# Patient Record
Sex: FEMALE | Race: WHITE | Employment: OTHER | URBAN - METROPOLITAN AREA
[De-identification: names, ages, dates, MRNs, and addresses within clinical notes are randomized per-mention and may not be internally consistent; named-entity substitution may affect disease eponyms.]

---

## 2022-11-02 ENCOUNTER — ANESTHESIA EVENT (OUTPATIENT)
Dept: SURGERY | Age: 62
End: 2022-11-02
Payer: COMMERCIAL

## 2022-11-03 ENCOUNTER — ANESTHESIA (OUTPATIENT)
Dept: SURGERY | Age: 62
End: 2022-11-03
Payer: COMMERCIAL

## 2022-11-03 ENCOUNTER — HOSPITAL ENCOUNTER (OUTPATIENT)
Age: 62
Setting detail: OUTPATIENT SURGERY
Discharge: HOME | End: 2022-11-03
Attending: PODIATRIST | Admitting: PODIATRIST
Payer: COMMERCIAL

## 2022-11-03 VITALS
BODY MASS INDEX: 42.16 KG/M2 | RESPIRATION RATE: 15 BRPM | DIASTOLIC BLOOD PRESSURE: 68 MMHG | HEART RATE: 64 BPM | SYSTOLIC BLOOD PRESSURE: 149 MMHG | WEIGHT: 223.33 LBS | HEIGHT: 61 IN | TEMPERATURE: 98 F | OXYGEN SATURATION: 100 %

## 2022-11-03 PROBLEM — M20.41 HAMMERTOE OF SECOND TOE OF RIGHT FOOT: Status: ACTIVE | Noted: 2022-11-03

## 2022-11-03 PROBLEM — M77.41 METATARSALGIA OF RIGHT FOOT: Status: ACTIVE | Noted: 2022-11-03

## 2022-11-03 PROCEDURE — 77030040361 HC SLV COMPR DVT MDII -B

## 2022-11-03 PROCEDURE — 74011000250 HC RX REV CODE- 250: Performed by: ANESTHESIOLOGY

## 2022-11-03 PROCEDURE — 74011250636 HC RX REV CODE- 250/636: Performed by: PODIATRIST

## 2022-11-03 PROCEDURE — 76060000062 HC AMB SURG ANES 1 TO 1.5 HR: Performed by: PODIATRIST

## 2022-11-03 PROCEDURE — 74011250636 HC RX REV CODE- 250/636: Performed by: ANESTHESIOLOGY

## 2022-11-03 PROCEDURE — 77030036687 HC SHOE PSTOP S2SG -A

## 2022-11-03 PROCEDURE — 2709999900 HC NON-CHARGEABLE SUPPLY: Performed by: PODIATRIST

## 2022-11-03 PROCEDURE — 76210000050 HC AMBSU PH II REC 0.5 TO 1 HR: Performed by: PODIATRIST

## 2022-11-03 PROCEDURE — 74011000250 HC RX REV CODE- 250: Performed by: PODIATRIST

## 2022-11-03 PROCEDURE — 77030000032 HC CUF TRNQT ZIMM -B: Performed by: PODIATRIST

## 2022-11-03 PROCEDURE — 76030000001 HC AMB SURG OR TIME 1 TO 1.5: Performed by: PODIATRIST

## 2022-11-03 PROCEDURE — 77030002986 HC SUT PROL J&J -A: Performed by: PODIATRIST

## 2022-11-03 PROCEDURE — 77030020275 HC MISC ORTHOPEDIC: Performed by: PODIATRIST

## 2022-11-03 PROCEDURE — 77030040922 HC BLNKT HYPOTHRM STRY -A

## 2022-11-03 PROCEDURE — 77030002933 HC SUT MCRYL J&J -A: Performed by: PODIATRIST

## 2022-11-03 PROCEDURE — C1713 ANCHOR/SCREW BN/BN,TIS/BN: HCPCS | Performed by: PODIATRIST

## 2022-11-03 DEVICE — IMPLANTABLE DEVICE: Type: IMPLANTABLE DEVICE | Site: FOOT | Status: FUNCTIONAL

## 2022-11-03 RX ORDER — PROPOFOL 10 MG/ML
INJECTION, EMULSION INTRAVENOUS AS NEEDED
Status: DISCONTINUED | OUTPATIENT
Start: 2022-11-03 | End: 2022-11-03 | Stop reason: HOSPADM

## 2022-11-03 RX ORDER — LIDOCAINE HYDROCHLORIDE 20 MG/ML
INJECTION, SOLUTION INFILTRATION; PERINEURAL AS NEEDED
Status: DISCONTINUED | OUTPATIENT
Start: 2022-11-03 | End: 2022-11-03 | Stop reason: HOSPADM

## 2022-11-03 RX ORDER — LIDOCAINE HYDROCHLORIDE 10 MG/ML
0.1 INJECTION, SOLUTION EPIDURAL; INFILTRATION; INTRACAUDAL; PERINEURAL AS NEEDED
Status: DISCONTINUED | OUTPATIENT
Start: 2022-11-03 | End: 2022-11-03 | Stop reason: HOSPADM

## 2022-11-03 RX ORDER — BUPIVACAINE HYDROCHLORIDE 5 MG/ML
INJECTION, SOLUTION EPIDURAL; INTRACAUDAL AS NEEDED
Status: DISCONTINUED | OUTPATIENT
Start: 2022-11-03 | End: 2022-11-03 | Stop reason: HOSPADM

## 2022-11-03 RX ORDER — SODIUM CHLORIDE 0.9 % (FLUSH) 0.9 %
5-40 SYRINGE (ML) INJECTION EVERY 8 HOURS
Status: DISCONTINUED | OUTPATIENT
Start: 2022-11-03 | End: 2022-11-03 | Stop reason: HOSPADM

## 2022-11-03 RX ORDER — SODIUM CHLORIDE 0.9 % (FLUSH) 0.9 %
5-40 SYRINGE (ML) INJECTION AS NEEDED
Status: DISCONTINUED | OUTPATIENT
Start: 2022-11-03 | End: 2022-11-03 | Stop reason: HOSPADM

## 2022-11-03 RX ORDER — MIDAZOLAM HYDROCHLORIDE 1 MG/ML
INJECTION, SOLUTION INTRAMUSCULAR; INTRAVENOUS AS NEEDED
Status: DISCONTINUED | OUTPATIENT
Start: 2022-11-03 | End: 2022-11-03 | Stop reason: HOSPADM

## 2022-11-03 RX ORDER — DIPHENHYDRAMINE HYDROCHLORIDE 50 MG/ML
12.5 INJECTION, SOLUTION INTRAMUSCULAR; INTRAVENOUS AS NEEDED
Status: DISCONTINUED | OUTPATIENT
Start: 2022-11-03 | End: 2022-11-03 | Stop reason: HOSPADM

## 2022-11-03 RX ORDER — CELECOXIB 200 MG/1
CAPSULE ORAL DAILY
COMMUNITY

## 2022-11-03 RX ORDER — LIDOCAINE HYDROCHLORIDE 10 MG/ML
INJECTION INFILTRATION; PERINEURAL AS NEEDED
Status: DISCONTINUED | OUTPATIENT
Start: 2022-11-03 | End: 2022-11-03 | Stop reason: HOSPADM

## 2022-11-03 RX ORDER — PROPOFOL 10 MG/ML
INJECTION, EMULSION INTRAVENOUS
Status: DISCONTINUED | OUTPATIENT
Start: 2022-11-03 | End: 2022-11-03 | Stop reason: HOSPADM

## 2022-11-03 RX ORDER — ALBUTEROL SULFATE 0.83 MG/ML
2.5 SOLUTION RESPIRATORY (INHALATION) AS NEEDED
Status: DISCONTINUED | OUTPATIENT
Start: 2022-11-03 | End: 2022-11-03 | Stop reason: HOSPADM

## 2022-11-03 RX ORDER — FLUMAZENIL 0.1 MG/ML
0.2 INJECTION INTRAVENOUS
Status: DISCONTINUED | OUTPATIENT
Start: 2022-11-03 | End: 2022-11-03 | Stop reason: HOSPADM

## 2022-11-03 RX ORDER — NALOXONE HYDROCHLORIDE 0.4 MG/ML
0.04 INJECTION, SOLUTION INTRAMUSCULAR; INTRAVENOUS; SUBCUTANEOUS
Status: DISCONTINUED | OUTPATIENT
Start: 2022-11-03 | End: 2022-11-03 | Stop reason: HOSPADM

## 2022-11-03 RX ORDER — SODIUM CHLORIDE, SODIUM LACTATE, POTASSIUM CHLORIDE, CALCIUM CHLORIDE 600; 310; 30; 20 MG/100ML; MG/100ML; MG/100ML; MG/100ML
125 INJECTION, SOLUTION INTRAVENOUS CONTINUOUS
Status: DISCONTINUED | OUTPATIENT
Start: 2022-11-03 | End: 2022-11-03 | Stop reason: HOSPADM

## 2022-11-03 RX ORDER — HYDROMORPHONE HYDROCHLORIDE 1 MG/ML
.25-1 INJECTION, SOLUTION INTRAMUSCULAR; INTRAVENOUS; SUBCUTANEOUS
Status: DISCONTINUED | OUTPATIENT
Start: 2022-11-03 | End: 2022-11-03 | Stop reason: HOSPADM

## 2022-11-03 RX ORDER — ONDANSETRON 2 MG/ML
4 INJECTION INTRAMUSCULAR; INTRAVENOUS AS NEEDED
Status: DISCONTINUED | OUTPATIENT
Start: 2022-11-03 | End: 2022-11-03 | Stop reason: HOSPADM

## 2022-11-03 RX ORDER — HYDROCHLOROTHIAZIDE 25 MG/1
12.5 TABLET ORAL DAILY
COMMUNITY

## 2022-11-03 RX ORDER — FENTANYL CITRATE 50 UG/ML
INJECTION, SOLUTION INTRAMUSCULAR; INTRAVENOUS AS NEEDED
Status: DISCONTINUED | OUTPATIENT
Start: 2022-11-03 | End: 2022-11-03 | Stop reason: HOSPADM

## 2022-11-03 RX ORDER — ATORVASTATIN CALCIUM 20 MG/1
TABLET, FILM COATED ORAL DAILY
COMMUNITY

## 2022-11-03 RX ORDER — FENTANYL CITRATE 50 UG/ML
25 INJECTION, SOLUTION INTRAMUSCULAR; INTRAVENOUS
Status: DISCONTINUED | OUTPATIENT
Start: 2022-11-03 | End: 2022-11-03 | Stop reason: HOSPADM

## 2022-11-03 RX ADMIN — CEFAZOLIN SODIUM 2 G: 1 POWDER, FOR SOLUTION INTRAMUSCULAR; INTRAVENOUS at 07:34

## 2022-11-03 RX ADMIN — MIDAZOLAM HYDROCHLORIDE 2 MG: 1 INJECTION, SOLUTION INTRAMUSCULAR; INTRAVENOUS at 07:24

## 2022-11-03 RX ADMIN — FENTANYL CITRATE 50 MCG: 50 INJECTION, SOLUTION INTRAMUSCULAR; INTRAVENOUS at 07:45

## 2022-11-03 RX ADMIN — PROPOFOL 50 MCG/KG/MIN: 10 INJECTION, EMULSION INTRAVENOUS at 07:31

## 2022-11-03 RX ADMIN — PROPOFOL 25 MG: 10 INJECTION, EMULSION INTRAVENOUS at 07:31

## 2022-11-03 RX ADMIN — PROPOFOL 25 MG: 10 INJECTION, EMULSION INTRAVENOUS at 07:33

## 2022-11-03 RX ADMIN — LIDOCAINE HYDROCHLORIDE 30 MG: 20 INJECTION, SOLUTION INFILTRATION; PERINEURAL at 07:30

## 2022-11-03 RX ADMIN — MIDAZOLAM HYDROCHLORIDE 3 MG: 1 INJECTION, SOLUTION INTRAMUSCULAR; INTRAVENOUS at 07:21

## 2022-11-03 RX ADMIN — SODIUM CHLORIDE, POTASSIUM CHLORIDE, SODIUM LACTATE AND CALCIUM CHLORIDE 125 ML/HR: 600; 310; 30; 20 INJECTION, SOLUTION INTRAVENOUS at 06:55

## 2022-11-03 RX ADMIN — FENTANYL CITRATE 50 MCG: 50 INJECTION, SOLUTION INTRAMUSCULAR; INTRAVENOUS at 08:07

## 2022-11-03 NOTE — BRIEF OP NOTE
Brief Postoperative Note    Patient: Wade Sotelo  YOB: 1960  MRN: [id number]    Date of Procedure: 11/3/2022     Pre-Op Diagnosis: Metatarsalgia, right foot [M77.41]  Hammer toe of right foot [M20.41]    Post-Op Diagnosis: Same as preoperative diagnosis. Procedure(s):  RIGHT WEIL SECOND METATARSAL OSTEOTOMY WITH SCREW FIXATION AND RIGHT SECOND HAMMER TOE CORRECTION OSTEOTOMY    Surgeon(s):  Sharron Santoyo DPM    Surgical Assistant: Surg Asst-1: Idris Delaney    Anesthesia: MAC     Estimated Blood Loss (mL): Minimal    Complications: None    Specimens: * No specimens in log *     Implants:   Implant Name Type Inv. Item Serial No.  Lot No. LRB No. Used Action   SCR QUICKFIX MAG 2X11MM --  - SN/A  SCR QUICKFIX MAG 2X11MM --  N/A [employer] N/A Right 1 Implanted       Drains: * No LDAs found *    Findings: Elongated bone, contracted soft tissues.      Electronically Signed by Lazarus Barreto DPM on 11/3/2022 at 8:48 AM

## 2022-11-03 NOTE — ANESTHESIA PREPROCEDURE EVALUATION
Relevant Problems   No relevant active problems       Anesthetic History     PONV          Review of Systems / Medical History  Patient summary reviewed and nursing notes reviewed    Pulmonary            Asthma : well controlled       Neuro/Psych   Within defined limits           Cardiovascular    Hypertension: well controlled          Hyperlipidemia    Exercise tolerance: >4 METS     GI/Hepatic/Renal     GERD: well controlled      Hiatal hernia and PUD     Endo/Other        Morbid obesity and arthritis     Other Findings              Physical Exam    Airway  Mallampati: IV    Neck ROM: normal range of motion   Mouth opening: Diminished (comment)     Cardiovascular    Rhythm: regular  Rate: normal         Dental         Pulmonary  Breath sounds clear to auscultation               Abdominal         Other Findings            Anesthetic Plan    ASA: 3  Anesthesia type: MAC            Anesthetic plan and risks discussed with: Patient      Informed consent obtained.

## 2022-11-03 NOTE — ANESTHESIA POSTPROCEDURE EVALUATION
Procedure(s):  RIGHT WEIL SECOND METATARSAL OSTEOTOMY WITH SCREW FIXATION AND RIGHT SECOND HAMMER TOE CORRECTION OSTEOTOMY. MAC    Anesthesia Post Evaluation      Multimodal analgesia: multimodal analgesia not used between 6 hours prior to anesthesia start to PACU discharge  Patient location during evaluation: PACU  Patient participation: complete - patient participated  Level of consciousness: awake and alert  Pain score: 0  Pain management: adequate  Airway patency: patent  Anesthetic complications: no  Cardiovascular status: hemodynamically stable and acceptable  Respiratory status: acceptable  Hydration status: acceptable  Comments: Patient seen and evaluated; no concerns. Post anesthesia nausea and vomiting:  none  Final Post Anesthesia Temperature Assessment:  Normothermia (36.0-37.5 degrees C)      INITIAL Post-op Vital signs:   Vitals Value Taken Time   /68 11/03/22 0850   Temp 36.7 °C (98 °F) 11/03/22 0842   Pulse 73 11/03/22 0854   Resp 17 11/03/22 0854   SpO2 100 % 11/03/22 0854   Vitals shown include unvalidated device data.

## 2022-11-03 NOTE — DISCHARGE INSTRUCTIONS
Post-Operative Instructions:    Patient Name: Federico Freeman  Patient MRN: [id number]  : 1960  Discharged Date: 11/3/2022      MEDICATIONS:   -If you experience nausea, take anti-nausea medication   -Take pain medication regularly for first 24 hours. Then take as needed for pain.     -Often anti-nausea medication helps relieve pain due to it's mild sedative effect.   -Constipation can be a common side effect when taking narcotic pain medications, take precautions to avoid this:    -Drink plenty of fluids    -Eat plenty of fiber    -Avoid caffeine and dairy products    -Take stool softener if needed (Colace/Dulcolax)    DIET:   -Eat light foods for first meal today (ie: dry cereal/toast/crackers, clear fluids). -If tolerated first meal, then can eat normally at second meal.    ACTIVITY:   -Relative BED REST for first 72 hours (only getting to bathroom, bedroom, kitchen)   -Keep surgical shoe/boot on at all times (even when sleeping)   -Elevate surgical site at all times (at least 6 inches above hip level)   -Apply ice pack to just above surgical site (NOT directly on the site), 10 mins on and 20 mins off for the first 72 hours.   -Weight-bearing: PWB with surgical shoe and walker. DRESSINGS/SHOWER:   -Keep dressing clean, dry, and intact at all times.   -Reinforce dressing as needed, but DO NOT remove dressing!! EMERGENCY:   -Call office ([phone]) or on all pager after hours ([phone]) if. ..    -bandages become wet or heavy drainage/bleeding noted    -medications are not helping your pain    -you injure or bump the surgical site    -you have fever over 101.5 degrees    FOLLOW-UP:   -Make sure you follow-up with your doctor within 1 week of surgery.    -Call office ([phone]) if you need to schedule appointment    Patients signature:  Date: 11/3/2022  Discharging Nurse:    Physician: Nestor Childers DPM            DISCHARGE SUMMARY from Your Nurse    PATIENT INSTRUCTIONS:    AFTER ANESTHESIA & SEDATION, and WHILE TAKING PAIN MEDICINE  After general anesthesia / intravenous sedation and the 24 hours following, and/or while taking prescription Opiates:  Limit your activities  Do not drive and operate hazardous machinery until you have been of all narcotics and sedatives for over 24 hours  Do not make important personal or business decisions  Do  not drink alcoholic beverages  If you have not urinated within 8 hours after discharge, please contact your surgeon on call. SIGNS OF INFECTION, THINGS TO REPORT TO YOUR DOCTOR  Report the following Signs of Infection or General Problems after surgery to your surgeon:  Excessive pain, swelling, redness, drainage, pus or odor of or around the surgical area  Fever/ temperature over 101; Temperature over 100 if on medications (chemotherapy or medicines which affect your ability to fight infections)  Nausea and vomiting lasting longer than 4 hours or if unable to take medications  Any signs of decreased circulation or nerve impairment to extremity: change in color, persistent  numbness, tingling, coldness or increase pain  If you have any questions. GOOD HELP TO FIGHT AN INFECTION  Here are a few tip to help reduce the chance of getting an infection after surgery:  Wash Your Hands  Good handwashing is the most important thing you and your caregiver can do. Wash before and after caring for any wounds. Dry your hand with a clean towel. Wash with soap and water for at least 20 seconds. A TIP: sing the \"Happy Birthday\" song through one time while washing to help with the timing. Use a hand  in between washings. Shower  When your surgeon says it is OK to take a shower, use a new bar of antibacterial soap (if that is what you use, and keep that bar of soap ONLY for your use), or antibacterial body wash. Use a clean wash cloth or sponge when you bathe.   Dry off with a clean towel  after every bath - be careful around any wounds, skin staples, sutures or surgical glue over/on wounds. Do not enter swimming pools, hot tubs, lakes, rivers and/or ocean until wounds are healed and your doctor/surgeon says it is OK. Use Clean Sheets  Sleep on freshly laundered sheets after your surgery. Keep the surgery site covered with a clean, dry bandage (if instructed to do so). If the bandage becomes soiled, reapply a new, dry, clean bandage. Do not allow pets to sleep with you while your wound is healing. Lifestyle Modification and Controlling Your Blood Sugar  Smoking slows wound healing. Stop smoking and limit exposure to second-hand smoke. High blood sugar slows wound healing. Eat a well-balanced diet to provide proper nutrition while healing  Monitor your blood sugar (if you are a diabetic) and take your medications as you are suppose to so you can control you blood sugar after surgery. URINARY RETENTION AFTER SURGERY  Some surgery (a planned, long surgery, bladder surgery, or some surgeries of the abdomen) require the placement of a chavira catheter (a drain tube in the bladder.)  This drain is often removed prior to you coming out of the OR, or is occasionally left in place to be removed before discharge, or sometimes the drain tube is left to be removed in the surgeon's office at a later time. Other surgeries never require a drain in the bladder. But sometimes after surgery, even if a drain was never placed, going to the bathroom can become a problem (you feel like you have to pee, your bladder feels full, but you just cant go.)  In this case, you might need to return to the hospital to have a drain catheter placed. Call your surgeon and tell them if this problem happens to you. COUGH AND DEEP BREATHE  Breathing deep and coughing are very important exercises to do after surgery. Deep breathing and coughing open the little air tubes and air sacks in your lungs. You take deep breaths every day.   You may not even notice - it is just something you do when you sigh or yawn. It is a natural exercise you do to keep these air passages open. After surgery, take deep breaths and cough, on purpose. Coughing and deep breathing help prevent bronchitis and pneumonia after surgery. If you had chest or belly surgery, use a pillow as a \"hug tex\" and hold it tightly to your chest or belly when you cough. DIRECTIONS:  Take 10 to 15 slow deep breaths every hour while awake. Breathe in deeply, and hold it for 2 seconds. Exhale slowly through puckered lips, like blowing up a balloon. After every 4th or 5th deep breath, hug your pillow to your chest or belly and give a hard, deep cough. Yes, it will probably hurt if you had abdominal surgery. But doing this exercise is very important part of healing after surgery. Take your pain medicine to help you do this exercise without too much pain. ANKLE PUMPS    Ankle pumps increase the circulation of oxygenated blood to your lower extremities and decrease your risk for circulation problems such as blood clots. They also stretch the muscles, tendons and ligaments in your foot and ankle, and prevent joint contracture in the ankle and foot, especially after surgeries on the legs. It is important to do ankle pump exercises regularly after surgery because immobility increases your risk for developing a blood clot. Your doctor may also have you take an Aspirin for the next few days as well. If your doctor did not ask you to take an Aspirin, consult with him before starting Aspirin therapy on your own. The exercise is quite simple. Slowly point your foot forward, feeling the muscles on the top of your lower leg stretch, and hold this position for 5 seconds. Next, pull your foot back toward you as far as possible, stretching the calf muscles, and hold that position for 5 seconds. Repeat with the other foot.   Perform 10 repetitions every hour while awake for both ankles if possible (down and then up with the foot once is one repetition). You should feel gentle stretching of the muscles in your lower leg when doing this exercise. If you feel pain, or your range of motion is limited, don't push too hard. Only go the limit your joint and muscles will let you go. If you have increasing pain, progressively worsening leg warmth or swelling, STOP the exercise and call your doctor. Other Wound Care information:  [] No additional recommendations. P.R.I.C.E. INSTRUCTIONS    PRICE is an acronym that stands for Protect, Rest, Ice, Compression, and Elevation (sometimes you might see the acronym RICE.)   Listed below are five activities one can do for an injured limb or soft tissue injury. While much anecdotal understanding learned through many years of experience supports these seemingly common sense treatments, building scientific evidence is showing how and why these treatment principles are proving to be so beneficial.  Below is a breakdown of what the PRICE principles entail to speed healing along. PROTECT may sound like an obvious thing to do, and really, it is common sense. After an injury or surgery, protecting the site that hurts help to prevent further injury. REST is essential for an injured limb. Like protection, the more you are up on an injured limb, especially in the early stages of an injury, the more damage you can do. Rest means no activities that would involve the use of the injured tissues so that the early stages of healing can begin without  interruption. ICE \"is perhaps the simplest and oldest [therapy] in the treatment of soft tissue injuries. \"4    Ice help decrease swelling in inflamed and damaged tissues, can diminish the feeling of pain and decrease muscle spasms, and, immediately after an injury,  can slow cellular metabolism and help to prevent further tissue injury from oxygen starvation caused by the swelling. 5      COMPRESSION help decrease pain by limiting movement of an injured limb. Compression can be found through the use of an elastic wrap bandage, a cast, splint, or simply a snug cooling cuff or an ice pack and pillow. ELEVATION is a very important intervention. Placing the injury above the level of the heart whenever possible helps decrease swelling by using gravity to one's advantage . Placing the injury above the level of the heart also helps prevent, or at least decrease, the throbbing pain that is sometimes experienced after surgery or injury. Sources:  Muscle injuries: optimizing recovery (2007) Best Practice & Research Clinical Rheumatology Vol. 21, No. 2, pp 317-331, Accessed 9/26/11    PRICE first aid guidelines - Protection, Rest, Ice, Compression and Elevation By Demarcus Hong [employer]. com Guide, Updated March 27, 2011, Accessed 9/26/11 [email or web address]/rehab/[email or web address]/[email or web address] htm    Rest Ice Compression Elevation: RICE for injuries, Accessed 9/26/11 [employer].ch. com/rest-ice-compression-elevation. html    The use of ice in the treatment of acute soft-tissue injury (2004) Sabrina, Vol. 32, No. 1, pp 251-261, Accessed 9/26/11 http://ajs.[employer]/content/32/1/251.full.pdf+html    Soft tissue damage and healing; theory and techniques, www.iaaf.org, Ch. 9 of  medical page, by kimmy Mathis And Zakluke Aparicio 9/27/11 [facility].gl. pdf                         Below is information on the medication(s) your doctor is prescribing for you: The maximum daily dose of acetaminophen was discussed with the patient. She was encouraged not to exceed 3,000 mg of acetaminophen during a 24 hour period and was asked to keep in mind that acetaminophen can also be found in many over-the-counter cold medications as well as narcotics that may be given for pain.  The patient expresses understanding of these issues and questions were answered. 4 THINGS ABOUT PAIN MEDICINE I ALWAYS TALK ABOUT:  There are 4 side effects I always talk about for pain medications. They make you sleepy and drowsy. Do not drive a car or operate machines while taking pain medication. Do not make any major decisions. Take a nap. Relax. Let your body recover from the affects of anesthesia and surgery. Some people have quite a problem with itching and... Nausea and/or vomiting. These are mention together because they are a related genetic issue; while some people experience these problems, others do not. These are expected and know side effects. Itching is caused by histamine release - practically all opiate medications can cause this. An over-the-counter anti-histamine can help. Over-the-counter Benadryl® (the generic drug name is diphenhydramine) can help, but may cause increased drowsiness which can be intensified by pain medications. Over-the-counter Claritin® (the generic drug name is loratadine) or Zyrtec® (the generic drug name is cetirizine) may be effective without as much drowsiness as with the Benadryl/diphenhydramine. If you have nausea, like the itching, practically all opioids can cause this. Hopefully your surgeon may have given you some medicine for nausea. If your surgeon did not give you anti-nausea medications, and you are experiencing nausea/vomiting that prevent you from drinking clear liquids, CALL HIM/HER and request them, especially if these issues seem to get worse after you leave the hospital.    Last but not least is the problem of constipation (not pily able to have a bowel movement - poop.)  All pain medicine can slow down the movement of food through the gut. The slower it goes, the worse it can be. This only adds insult to the injury of surgery.   And if you had tummy surgery, like having your gall bladder removed or a hernia repair, YOU DO NOT WANT THIS PROBLEM. There are 4 things I recommend. Drink lots of fluids. For healthy people with no heart problems, this means at least 64 ounces of liquids or more per day. For example, a Big Gulp® from [employer] is 32 ounces. So you need to drink at least 2  Big Gulp®'s of fluids every day. If you have heart problems you may not be able to do this. Talk to your doctor about what you should do to prevent constipation. Drinking fruit juice like apple, pear, or prune juice gives you extra \"BANG\" for your beverage. These drinks are high in natural fiber. If you are a diabetic, drink sugar-free fluids with fiber additives (see next 2 points.)  Avoid drinking extra fruit juice unless this is a regular part of your diet plan. Eat extra fresh fruits and vegetables. Add extra fiber-products. Fiber products like Metamucil®, Citrucel®, Miralax® or Benefiber® can help. These products are over-the-counter and you do not need a prescription from your doctor. If you have followed these recommendations and still have some difficulty having a good poop, take and over-the-counter stimulant like Dulcolax® (biscodyl)  or Senokot® (senna concentrate). These may help get things moving. [facility] MEDICATION AND   SIDE EFFECT GUIDE    The [employer] MEDICATION AND SIDE EFFECT GUIDE was provided to the PATIENT AND CARE PROVIDER. Information provided includes instruction about drug purpose and common side effects for the following medications:   None - already provided by surgeon      Medication information added to discharge record on November 3, 2022 at 8:59 AM.      Some information we wish all of our patients to be familiar with and General Information for Healthy Lifestyle choices:    Make a list of your current medications with your Primary Care Provider.   Update this list whenever your medications are discontinued, doses are changed, or new medications (including over-the-counter products like ibuprofen, vitamins, or herbal remedies) are added. Carry medication information at all times in case of emergency situations      No smoking / No tobacco products / Avoid exposure to second hand smoke    Surgeon General's Warning:  Quitting smoking now greatly reduces serious risk to your health. Obesity, smoking, and sedentary lifestyle greatly increases your risk for illness. A healthy diet, regular physical exercise & weight monitoring are important for maintaining a healthy lifestyle. A Note About Congestive Heart Failure: You may be retaining fluid if you have a history of heart failure or if you experience any of the following symptoms:      Weight gain of 3 pounds or more overnight or 5 pounds in a week  Increased swelling in our hands or feet  Shortness of breath while lying flat in bed      Please call your doctor as soon as you notice any of these symptoms; do not wait until your next office visit. A Note About Strokes:  Recognize signs and symptoms of STROKE. The simple mnemonic, F.A.S.T., can help you remember signs of a stroke and what to do if you suspect a stroke is occuring to you or someone you are with:    F - Face looks uneven  A - Arms unable to move, or move evenly  S - Speech is slurred or non-existent  T - Time - CALL 911 as soon as signs and symptoms begin - DO NOT go to bed or wait to see if you get better - TIME IS BRAIN. Warning Signs of HEART ATTACK   Call 911 if you have these symptoms:    Chest discomfort. Most heart attacks involve discomfort in the center of the chest that lasts more than a few minutes, or that goes away and comes back. It can feel like uncomfortable pressure, squeezing, fullness, or pain. Discomfort in other areas of the upper body. Symptoms can include pain or discomfort in one or both arms, the back, neck, jaw, or stomach. Shortness of breath with or without chest discomfort.     Other signs may include breaking out in a cold sweat, nausea, or lightheadedness. Don't wait more than five minutes to call 911 - MINUTES MATTER! Fast action can save your life. Calling 911 is almost always the fastest way to get lifesaving treatment. Emergency Medical Services staff can begin treatment when they arrive -- up to an hour sooner than if someone gets to the hospital by car. Learning About Coronavirus [phone])  Coronavirus [phone]): Overview  What is coronavirus (COVID-19)? The coronavirus disease (COVID-19) is caused by a virus. It is an illness that was first found in [place], [place], in December 2019. It has since spread worldwide. The virus can cause fever, cough, and trouble breathing. In severe cases, it can cause pneumonia and make it hard to breathe without help. It can cause death. Coronaviruses are a large group of viruses. They cause the common cold. They also cause more serious illnesses like Middle East respiratory syndrome (MERS) and severe acute respiratory syndrome (SARS). COVID-19 is caused by a novel coronavirus. That means it's a new type that has not been seen in people before. This virus spreads person-to-person through droplets from coughing and sneezing. It can also spread when you are close to someone who is infected. And it can spread when you touch something that has the virus on it, such as a doorknob or a tabletop. What can you do to protect yourself from coronavirus (COVID-19)? The best way to protect yourself from getting sick is to: Avoid areas where there is an outbreak. Avoid contact with people who may be infected. Wash your hands often with soap or alcohol-based hand sanitizers. Avoid crowds and try to stay at least 6 feet away from other people. Wash your hands often, especially after you cough or sneeze. Use soap and water, and scrub for at least 20 seconds. If soap and water aren't available, use an alcohol-based hand . Avoid touching your mouth, nose, and eyes.   What can you do to avoid spreading the virus to others? To help avoid spreading the virus to others:  Cover your mouth with a tissue when you cough or sneeze. Then throw the tissue in the trash. Use a disinfectant to clean things that you touch often. Stay home if you are sick or have been exposed to the virus. Don't go to school, work, or public areas. And don't use public transportation. If you are sick:  Leave your home only if you need to get medical care. But call the doctor's office first so they know you're coming. And wear a face mask, if you have one. If you have a face mask, wear it whenever you're around other people. It can help stop the spread of the virus when you cough or sneeze. Clean and disinfect your home every day. Use household  and disinfectant wipes or sprays. Take special care to clean things that you grab with your hands. These include doorknobs, remote controls, phones, and handles on your refrigerator and microwave. And don't forget countertops, tabletops, bathrooms, and computer keyboards. When to call for help  Call 911 anytime you think you may need emergency care. For example, call if:  You have severe trouble breathing. (You can't talk at all.)  You have constant chest pain or pressure. You are severely dizzy or lightheaded. You are confused or can't think clearly. Your face and lips have a blue color. You pass out (lose consciousness) or are very hard to wake up. Call your doctor now if you develop symptoms such as:  Shortness of breath. Fever. Cough. If you need to get care, call ahead to the doctor's office for instructions before you go. Make sure you wear a face mask, if you have one, to prevent exposing other people to the virus. Where can you get the latest information? The following health organizations are tracking and studying this virus. Their websites contain the most up-to-date information. Ladan Salas also learn what to do if you think you may have been exposed to the virus.   U.S. Centers for Disease Control and Prevention (CDC): The CDC provides updated news about the disease and travel advice. The website also tells you how to prevent the spread of infection. www.cdc.gov  World Health Organization [facility]): WHO offers information about the virus outbreaks. WHO also has travel advice. www.who.int  Current as of: April 1, 2020               Content Version: 12.4  © 2006-2020 Healthwise, Incorporated. Care instructions adapted under license by your healthcare professional. If you have questions about a medical condition or this instruction, always ask your healthcare professional. [address] any warranty or liability for your use of this information. AT THE COMPLETION OF DISCHARGE INSTRUCTION REVIEW, WE VERIFY:  The discharge information has been reviewed with the patient and caregiver. Questions have been asked and answered meeting patient and caregiver expectations. The patient and caregiver verbalized understanding.     Your discharge nurse was Miesha Pressley RN-BC       Board Certified - Pain Management      CONTENTS FOUND IN YOUR DISCHARGE ENVELOPE:  [x]     Surgeon and Hospital Discharge Instructions  []     [facility] Surgical Services Care Provider Card  []     Medication & Side Effect Guide            (your newly prescribed medications have been marked/highlighted showing the most common side effects from the classes of drugs on your prescriptions)  []     Medication Prescription(s) x 0 ( [] These have been sent electronically to your pharmacy by your surgeon,   - OR -       your surgeon has already provided these to you during a previous/pre-op office visit)  []     Pain block and/or block with On-Q Catheter from Anesthesia Service (information included in your instructions above)        []    [facility] Education Information  []     Physical Therapy Prescription  []     Follow-up Appointment Cards  []     Surgery-related Pictures/Media  []     Medical device information sheets/pamphlets from their    []     School/work excuse note. []     /parent work excuse note. The following personal items collected during your admission for safe keeping are returned to you:     Dental Appliance: Dental Appliances: None  Vi mary:    Hearing Aid:    Jewelry: Jewelry: None  Clothing: Clothing: Footwear, Pants, Socks, Shirt, Undergarments  Other Valuables:  Other Valuables: Eyeglasses  Valuables sent to safe:

## 2022-11-04 NOTE — OP NOTES
[facility] 78  OPERATIVE REPORT    Name:  Milli Lopez  MR#:  [id number]  :  1960  ACCOUNT #:  [de-identified]  DATE OF SERVICE:  2022    PREOPERATIVE DIAGNOSES:  1. Elongated and plantarflexed second metatarsal of the right foot. 2.  Hammertoe deformity of the second digit of the right foot. POSTOPERATIVE DIAGNOSES:  1. Elongated and plantarflexed second metatarsal of the right foot. 2.  Hammertoe deformity of the second digit of the right foot. PROCEDURES PERFORMED:  1. Second metatarsal osteotomy with screw fixation and with tenotomy and capsulotomy. 2.  Hammertoe correction of the second digit of the right foot. SURGEON:  Jose Luis Trevizo DPM    ASSISTANT:  None. ANESTHESIA:  Monitored anesthesia care. ANESTHESIOLOGIST:  David Mackey DO    COMPLICATIONS:  None. SPECIMENS REMOVED:  None. IMPLANTS:  One 2.0-mm Arthrex twist-off screw. ESTIMATED BLOOD LOSS:  Less than 5 mL. INDICATIONS:  The patient is a 79-year-old white female who presented to the office with a chronically painful second toe and second metatarsophalangeal joint and second metatarsal.  The patient reported great difficulty in wearing any closed toe shoe due to the semi-rigid nature of her deformity. The patient had undergone a course of nonsurgical care in the form of changing shoe gear, digital orthosis to try to reduce the deformity, all to no symptomatic relief. The patient was made aware, however, of all treatment options from conservative to surgical.  The patient declined further nonsurgical care. The patient was made aware of all risks, potential benefits, potential complications of surgical management, and also was made aware of alternatives.   As far as risks and complications, these were reviewed including but limited to need for additional surgery, failure of the procedure to achieve desired results, swelling, stiffness, scarring, numbness, nerve damage, delayed union, nonunion or malunion of bone, failure of fixation, need for additional surgery, potential of infection of the soft tissue and/or the bone, potential of a prolonged postoperative course, potential of prolonged pain postoperatively, potential of chronic pain syndrome, potential of over or under correction, potential of surgery making her condition worse and not better, and the potential of not getting any relief from the surgery. The patient was also made aware of the fact that coming to the hospital would increase her risk of magdaleno the COVID-19 virus, and if she did contract the virus after having undergone anesthesia, she would likely have a worsened case of the disease. The patient had the consent reviewed, understood, and signed and elected for surgical management of the condition. PREPARATION AND PROCEDURE:  The patient was taken to the operating room and placed on the operating room table in supine position. The patient had a well-padded pneumatic ankle tourniquet applied to the right ankle. The patient did receive 15 mL of 1% lidocaine for preoperative analgesia and had the right foot and ankle prepped and draped. The patient also received 2 g of Ancef for preoperative prophylactic antibiotics. The right foot was exsanguinated with an Esmarch bandage, 250 mmHg on the tourniquet and was elevated to and then the procedure began. PROCEDURE #1:  Weil second metatarsal osteotomy with screw fixation with tenotomy and capsulotomy. Attention was directed towards the dorsal aspect of second metatarsophalangeal joint where a curvilinear incision was made approximately 3.5 cm in length. The incision was deepened down to the level of subcutaneous tissue taking care to appropriately retract and ligate all neurovascular structures as necessary.   Then, through both blunt and sharp dissection, the incision was deepened down to the level of the extensor tendon which was identified and lengthened in Z fashion. The site was copiously flushed and irrigated. There was noted be residual contracture at the dorsal aspect of the second metatarsophalangeal joint. So, a transverse capsulotomy was performed and then that incision was extended in T fashion over the dorsal aspect of the distal portion of the second metatarsal head and metatarsal neck of the right foot. The periosteum was elevated with the use of a #15-blade and Brown-Adson forceps and then, using oscillating saw, a Weil osteotomy cut was made into the second metatarsal head. The capital fragment was shifted slightly medially to reduce varus deformity of the second toe and brought proximally to shorten the metatarsal.  Then, following strict AO technique, one 2.0-mm spin screw from Arthrex/twist-off screw from Arthrex was utilized to fixate the osteotomy site. Intraoperative C-arm confirmed excellent reduction of preoperative deformity. Site was copiously flushed and irrigated. There was noted to be residual contracture at the second toe of the right foot at the proximal phalanx which was dorsiflexed. Attention was directed towards the dorsiflexed second digit of the right foot. PROCEDURE #2:  Hammertoe correction of the second digit of the right foot. Attention was directed towards the plantar aspect of the second digit where a small stab incision was made plantarly under intraoperative C-arm guidance with the use of a #18-gauge needle. The 18-gauge needle then was brought to the base of the proximal phalanx under intraoperative C-arm guidance. Then, a small stab incision was made at that site after the needle was removed with the use of a #15-blade. Then, the dissection was deepened down to the level of subcutaneous tissue taking care to appropriately retract and ligate all neurovascular structures as necessary.   Then, through both blunt and sharp dissection, the incision was deepened down to the level of the base of the proximal phalanx and then with the use of a Arbon elevator, the blunt dissection was taken down to the level of the base of the proximal phalanx of the second digit of the right foot. Then, with the #15 blade, a periosteal incision was made. Periosteum was elevated off of the base of the proximal phalanx of the second toe of the right foot. It should be noted that the flexor tendon was found to be contracted and that was released in tenotomy fashion. The site was copiously flushed and irrigated. Then, using the Arthrex 13-mm side-cutting kaycee, a plantarflexed Weil osteotomy cut was made into the base of the proximal phalanx of the second digit of the right foot. The osteotomy was plantarflexed and greatly reduced the preoperative deformity. Site was copiously flushed and irrigated. The intraoperative C-arm confirmed excellent reduction of preoperative deformities. Next, capsular closure was obtained with the use of 3-0 Vicryl in interrupted simple suture fashion. Then, the extensor tendon was sutured in a lengthened position however with physiologic tension, with 3-0 prolene, then subcutaneous tissue was reapproximated with the use of 3-0 Monocryl in an interrupted simple suture fashion. The skin was closed with the use of 4-0 Prolene in interrupted simple suture fashion and then the skin was closed with the use of 4-0 Prolene in interrupted horizontal mattress suture fashion at all incisions. The patient received 15 mL of 0.5% Marcaine plain for postoperative analgesia. The incisions were dressed with Xeroform, 4x4s, Ayleen, and an Ace bandage. Postoperatively, the patient was stable. The patient tolerated the procedure well without complication, was transported from the OR to the postanesthesia care unit with neurovascular status intact to all digits of the right foot. The patient will follow up as an outpatient.         Ritu [id number]  D:  11/03/2022 13:34  T: 11/03/2022 21:04  JOB #:  [id number]

## (undated) DEVICE — Device

## (undated) DEVICE — SUT MCRYL 4-0 27IN PS2 UD --

## (undated) DEVICE — SYRINGE IRRIG 60ML SFT PLIABLE BLB EZ TO GRP 1 HND USE W/

## (undated) DEVICE — SPONGE LAP 18X18IN STRL -- 5/PK

## (undated) DEVICE — GLOVE SURG SZ 85 L12IN FNGR ORTHO 126MIL CRM LTX FREE

## (undated) DEVICE — EXTREMITY-SFMCASU: Brand: MEDLINE INDUSTRIES, INC.

## (undated) DEVICE — GLOVE SURG SZ 85 L12IN FNGR THK79MIL GRN LTX FREE

## (undated) DEVICE — HYPODERMIC SAFETY NEEDLE: Brand: MAGELLAN

## (undated) DEVICE — BANDAGE COMPR W4INXL5YD WHT BGE POLY COT M E WRP WV HK AND

## (undated) DEVICE — SUTURE MCRYL SZ 3-0 L27IN ABSRB UD L26MM SH 1/2 CIR Y416H

## (undated) DEVICE — SUTURE PROL SZ 4-0 L18IN NONABSORBABLE BLU L13MM P-3 3/8 8699G

## (undated) DEVICE — SOLUTION IRRIG 1000ML 0.9% SOD CHL USP POUR PLAS BTL

## (undated) DEVICE — BANDAGE,GAUZE,CONFORMING,3"X75",STRL,LF: Brand: MEDLINE

## (undated) DEVICE — ZIMMER® STERILE DISPOSABLE TOURNIQUET CUFF WITH PROTECTIVE SLEEVE AND PLC, DUAL PORT, SINGLE BLADDER, 18 IN. (46 CM)

## (undated) DEVICE — 1016 S-DRAPE IRRIG POUCH 10/BOX: Brand: STERI-DRAPE™

## (undated) DEVICE — DRESSING,GAUZE,XEROFORM,CURAD,1"X8",ST: Brand: CURAD

## (undated) DEVICE — BURR AR300B002

## (undated) DEVICE — INTENDED FOR TISSUE SEPARATION, AND OTHER PROCEDURES THAT REQUIRE A SHARP SURGICAL BLADE TO PUNCTURE OR CUT.: Brand: BARD-PARKER ® CARBON RIB-BACK BLADES

## (undated) DEVICE — CANISTER, RIGID, 3000CC: Brand: MEDLINE INDUSTRIES, INC.